# Patient Record
Sex: FEMALE | Race: WHITE | URBAN - METROPOLITAN AREA
[De-identification: names, ages, dates, MRNs, and addresses within clinical notes are randomized per-mention and may not be internally consistent; named-entity substitution may affect disease eponyms.]

---

## 2017-01-09 ENCOUNTER — HOSPITAL ENCOUNTER (OUTPATIENT)
Dept: NEUROLOGY | Age: 67
Discharge: HOME | End: 2017-01-09
Attending: TRANSPLANT SURGERY | Admitting: TRANSPLANT SURGERY

## 2017-01-09 DIAGNOSIS — M79.604 PAIN OF RIGHT LEG: ICD-10-CM

## 2017-05-16 ENCOUNTER — HOSPITAL ENCOUNTER (OUTPATIENT)
Dept: PHYSICAL THERAPY | Age: 67
Discharge: HOME | End: 2017-05-31
Admitting: ORTHOPAEDIC SURGERY

## 2017-05-18 ENCOUNTER — HOSPITAL ENCOUNTER (OUTPATIENT)
Dept: PHYSICAL THERAPY | Age: 67
Discharge: HOME | End: 2017-05-18

## 2017-05-23 ENCOUNTER — HOSPITAL ENCOUNTER (OUTPATIENT)
Dept: PHYSICAL THERAPY | Age: 67
Discharge: HOME | End: 2017-05-23

## 2017-05-25 ENCOUNTER — HOSPITAL ENCOUNTER (OUTPATIENT)
Dept: PHYSICAL THERAPY | Age: 67
Discharge: HOME | End: 2017-05-25

## 2017-05-30 ENCOUNTER — HOSPITAL ENCOUNTER (OUTPATIENT)
Dept: PHYSICAL THERAPY | Age: 67
Discharge: HOME | End: 2017-05-30

## 2017-06-01 ENCOUNTER — HOSPITAL ENCOUNTER (OUTPATIENT)
Dept: PHYSICAL THERAPY | Age: 67
Discharge: HOME | End: 2017-06-01

## 2017-06-06 ENCOUNTER — HOSPITAL ENCOUNTER (OUTPATIENT)
Dept: PHYSICAL THERAPY | Age: 67
Discharge: HOME | End: 2017-06-06

## 2017-06-08 ENCOUNTER — HOSPITAL ENCOUNTER (OUTPATIENT)
Dept: PHYSICAL THERAPY | Age: 67
Discharge: HOME | End: 2017-06-08

## 2017-06-13 ENCOUNTER — HOSPITAL ENCOUNTER (OUTPATIENT)
Dept: PHYSICAL THERAPY | Age: 67
Discharge: HOME | End: 2017-06-13

## 2017-06-29 ENCOUNTER — HOSPITAL ENCOUNTER (OUTPATIENT)
Dept: PHYSICAL THERAPY | Age: 67
Discharge: HOME | End: 2017-06-29

## 2017-07-06 ENCOUNTER — HOSPITAL ENCOUNTER (OUTPATIENT)
Dept: PHYSICAL THERAPY | Age: 67
Discharge: HOME | End: 2017-07-06

## 2017-07-13 ENCOUNTER — HOSPITAL ENCOUNTER (OUTPATIENT)
Dept: PHYSICAL THERAPY | Age: 67
Discharge: HOME | End: 2017-07-13

## 2017-08-15 ENCOUNTER — HOSPITAL ENCOUNTER (OUTPATIENT)
Dept: PHYSICAL THERAPY | Age: 67
Discharge: HOME | End: 2017-08-15

## 2017-08-29 ENCOUNTER — HOSPITAL ENCOUNTER (OUTPATIENT)
Dept: PHYSICAL THERAPY | Age: 67
Discharge: HOME | End: 2017-08-29

## 2017-09-12 ENCOUNTER — HOSPITAL ENCOUNTER (OUTPATIENT)
Dept: PHYSICAL THERAPY | Age: 67
Discharge: HOME | End: 2017-09-12

## 2017-09-26 ENCOUNTER — HOSPITAL ENCOUNTER (OUTPATIENT)
Dept: PHYSICAL THERAPY | Age: 67
Discharge: HOME | End: 2017-09-26

## 2017-10-12 ENCOUNTER — HOSPITAL ENCOUNTER (OUTPATIENT)
Dept: PHYSICAL THERAPY | Age: 67
Discharge: HOME | End: 2017-10-12

## 2017-10-12 NOTE — DISCHARGE SUMMARY
[facility]  Orthopaedics and Sports Rehabilitation, [facility]    Physical Therapy Daily Treatment Note  Date:  10/12/2017    Patient Name:  Facundo Graves    :  1950  MRN: [id number]  Restrictions/Precautions:    Medical/Treatment Diagnosis Information:  Diagnosis: M51.26 HNP; M51.36 DDD  Treatment Diagnosis: M54.5 Lumbago  Insurance/Certification information:  PT Insurance Information: [employer]  Physician Information:  Referring Practitioner: Thomas Solo  Plan of care signed (Y/N):     Date of Patient follow up with Physician: 17     G-Code (if applicable):      Date G-Code Applied: 10/12/17 FESTUS  6% deficit  PT G-Codes  Functional Assessment Tool Used: FESTUS  Score: 6% deficit  Functional Limitation: Changing and maintaining body position  Changing and Maintaining Body Position Goal Status (): 0 percent impaired, limited or restricted  Changing and Maintaining Body Position Discharge Status (): At least 1 percent but less than 20 percent impaired, limited or restricted    Progress Note: [x]  Yes  []  No  Next due by: D/C    Latex Allergy:  [x]NO      []YES  Preferred Language for Healthcare:   [x]English       []other:    Visit # Insurance Allowable   17  10/12 UNL      Pain level: 0/10  10/12    SUBJECTIVE:  10/12 Patient states she has been working out at [employer]. She states she has had some slight discomfort that comes and goes but overall she is okay.      OBJECTIVE:  Discectomy and laminectomy 17      ROM 10/12  Comments   Trunk flexion WFL    Trunk extension 50% impaired    Trunk R sidebend WFL    Trunk L sidebend WFL    Trunk R rotation WFL    Trunk L rotation WFL    HS flexibility                 Strength  10/12 Left Right Comments   Hip flexion(L2) 5 5    Knee extension(L3) 5 5    Knee flexion(S1-2) 5 5    Ankle dorsiflexion(L4) 5 4+    Toe extension(L5)      Ankle eversion/plantar flexion(S1) 5 5      Special tests  10/12  Comments   SLR     Slump test     Pelvic symmetry      Segmental Spinal mobility     Heel walk negative    Toe walk negative    Tandem walk            DTRs   10/12   Left Right Comments   Patellar(L3-L4) = =    Achilles(S1-S2) = =            Joint mobility: 10/12   []Normal    [x]Hypo   []Hyper    Palpation: Not taken 5/16    Functional Mobility/Transfers: Patient independent with ADLs, self care and recreational activities; patient has returned to walking; progressing into gym program  10/12    Posture: Grossly WNL 6/13    Gait: (include devices/WB status): FWB; grossly WNL 10/12                          [x] Patient history, allergies, meds reviewed. Medical chart reviewed. See intake form. 5/16    Review Of Systems (ROS):  [x]Performed Review of systems (Integumentary, CardioPulmonary, Neurological) by intake and observation. Intake form has been scanned into medical record. Patient has been instructed to contact their primary care physician regarding ROS issues if not already being addressed at this time. 5/16    RESTRICTIONS/PRECAUTIONS: BLT: Bending, Lifting, Twisting    Exercises/Interventions:   All exercises performed bilaterally  ROM/stretches     SKTC 30 sec x 5 Start 5/16   DKTC     Prayer stretch     Supine HS 30 sec x 5 Start 5/16   Pelvic tilt     Incline stretch 30 sec x 5 Start 6/6   Cat and camel     Piriformis stretch 30 sec x 5 Start 5/16   Strengthening     SLR 3 x 10; 2# bilateral ^9/26   Quadruped alternate UE reaches     Quadruped alternate LE reaches     Quadruped alternate UE/LE reaches     Ball squats 3 x 10 Start 7/6   Ball heel raises     Tandem stance on airex 30 sec x 5 Start 8/15   Balance board 30 sec x 5 Start 8/15   Tband lat pulls 3 x 10; silver On SB; ^8/29   Tband rows 3 x 10; silver On SB; ^8/29   TA ISo with knee fall outs (90/90) 30 x each leg Start 8/29   TA IsoTA iso heel slides 10 sec x 10 Start 5/16   TA Iso Abd 10 sec x 10 silver; ^7/13   TA Iso Add 10 sec x 10 start 5/18   TA Iso with SB 10 sec x 10 Start pain, promoting relaxation,  increasing ROM, reducing/eliminating soft tissue swelling/inflammation/restriction, improving soft tissue extensibility and allowing for proper ROM for normal function with self care, mobility, lifting and ambulation. Modalities:   Ice + e-stim15 min   6/13   Charges:  Timed Code Treatment Minutes: 45   Total Treatment Minutes:  65        [] EVAL (LOW) 04567 (typically 20 minutes face-to-face)  [] EVAL (MOD) 43253 (typically 30 minutes face-to-face)  [] EVAL (HIGH) 88423 (typically 45 minutes face-to-face)  [] RE-EVAL     [x] PD(92096) x  1   [] IONTO  [x] NMR (78130) x  1   [] VASO  [] Manual (12533) x       [] Other:   [x] TA x  1    [] Mech Traction (78040)  [] ES(attended) (96410)      [] ES (un) (84487): 6/13     Goals:   Patient stated goal: Reduce amount of pain and return to PLOF. Therapist goals for Patient:   Short Term Goals: To be achieved in: 2 weeks  1. Independent in HEP and progression per patient tolerance, in order to prevent re-injury. MET  2. Patient will have a decrease in pain to facilitate improvement in movement, function, and ADLs as indicated by Functional Deficits. MET    Long Term Goals: To be achieved in: 4-6 weeks  1. Disability index score of 10% or less for the [place] to assist with reaching prior level of function. MET  2. Patient will demonstrate increased AROM to WNL, good LS mobility, good hip ROM to allow for proper joint functioning as indicated by patients Functional Deficits. PARTIALLY MET  3. Patient will demonstrate an increase in Strength to good proximal hip and core activation to allow for proper functional mobility as indicated by patients Functional Deficits. MET  4. Patient will return to Independent functional activities without increased symptoms or restriction. MET  5. Patient will return to walking distances greater than 3 miles a day with no report of increased pain or symptoms.   MET     Progression Towards Functional goals:  [x] Patient is progressing as expected towards functional goals listed. [] Progression is slowed due to complexities listed. [] Progression has been slowed due to co-morbidities. [] Plan just implemented, too soon to assess goals progression  [] Other:     ASSESSMENT:      Treatment/Activity Tolerance:  [x] Patient tolerated treatment well 10/12 [] Patient limited by fatique  [] Patient limited by pain  [] Patient limited by other medical complications  [x] Other: 10/12 Patient has met all short term and long term goals. Patient is independent with HEP and has progressed to a gym program guided by therapist. Patient is please with progress and has returned to her healthy lifestyle. Patient understands her restrictions and that she must watch her posture and body mechanics when performing activities. Patient has been encouraged to reach out to therapist with any further questions. Patient given contact info and verbalized understanding. Patient no longer requires skilled physical therapist at this time. Discharge from physical therapy.        Patient education:   5/18 Avoid activities that increase any pain or symptoms  6/8 Discussed activity modification and exercises that are okay to perform  7/13 Discussed and reviewed classes offered at [facility] and what classes are appropriate and safe for patient; okay for gentle yoga/chair yoga, aquatic cardio class, and silver sneakers program.    Prognosis: [] Good [x] Fair  [] Poor    Patient Requires Follow-up: [] Yes  [x] No    PLAN:   [] Continue per plan of care [] Alter current plan (see comments)  [] Plan of care initiated [] Hold pending MD visit [x] Discharge    Electronically signed by: Nini Leal PT, DPT

## 2017-11-01 ENCOUNTER — HOSPITAL ENCOUNTER (OUTPATIENT)
Dept: PHYSICAL THERAPY | Age: 67
Discharge: HOME | End: 2017-11-30
Attending: ORTHOPAEDIC SURGERY | Admitting: ORTHOPAEDIC SURGERY